# Patient Record
Sex: FEMALE | Race: ASIAN | NOT HISPANIC OR LATINO | URBAN - METROPOLITAN AREA
[De-identification: names, ages, dates, MRNs, and addresses within clinical notes are randomized per-mention and may not be internally consistent; named-entity substitution may affect disease eponyms.]

---

## 2022-08-03 ENCOUNTER — EMERGENCY (EMERGENCY)
Facility: HOSPITAL | Age: 27
LOS: 0 days | Discharge: HOME | End: 2022-08-03
Attending: EMERGENCY MEDICINE
Payer: MEDICAID

## 2022-08-03 VITALS
TEMPERATURE: 99 F | OXYGEN SATURATION: 100 % | RESPIRATION RATE: 17 BRPM | HEART RATE: 92 BPM | DIASTOLIC BLOOD PRESSURE: 68 MMHG | SYSTOLIC BLOOD PRESSURE: 112 MMHG

## 2022-08-03 VITALS — WEIGHT: 102.07 LBS

## 2022-08-03 DIAGNOSIS — M54.50 LOW BACK PAIN, UNSPECIFIED: ICD-10-CM

## 2022-08-03 DIAGNOSIS — M54.40 LUMBAGO WITH SCIATICA, UNSPECIFIED SIDE: ICD-10-CM

## 2022-08-03 PROCEDURE — 99283 EMERGENCY DEPT VISIT LOW MDM: CPT

## 2022-08-03 RX ORDER — IBUPROFEN 200 MG
600 TABLET ORAL ONCE
Refills: 0 | Status: COMPLETED | OUTPATIENT
Start: 2022-08-03 | End: 2022-08-03

## 2022-08-03 RX ADMIN — Medication 60 MILLIGRAM(S): at 12:52

## 2022-08-03 RX ADMIN — Medication 600 MILLIGRAM(S): at 12:52

## 2022-08-03 NOTE — ED STATDOCS - PATIENT PORTAL LINK FT
You can access the FollowMyHealth Patient Portal offered by [facility] by registering at the following website: [email or web address]. By joining [facility]’s FollowMyHealth portal, you will also be able to view your health information using other applications (apps) compatible with our system.

## 2022-08-03 NOTE — ED STATDOCS - CARE PROVIDER_API CALL
Padilla Pryor; PhD)  Neurosurgery  [address] [facility] [address] [facility], [address]nd [place] [address]  Phone: [phone]  Fax: [phone]  Follow Up Time: Urgent

## 2022-08-03 NOTE — ED STATDOCS - OBJECTIVE STATEMENT
26 y/o female w/ no pertinent PMHx presents to the ED c/o lower back pain radiating down her right leg. Pt reports this pain started while she was pregnant and was told the pain would resolve after the pregnancy however she had birth 2.5 months ago and the pain has persisted. Pt states pain is worse at night. Denies and bladder or bowel issues. Denies any fall or injury. No other complaints at this time. 28 y/o female w/ no pertinent PMHx presents to the ED c/o lower back pain radiating down her right leg. Pt reports this pain started while she was pregnant and was told the pain would resolve after the pregnancy however she had birth 2.5 months ago and the pain has persisted. Pt states pain is worse at night. Denies any bladder or bowel issues. Denies any fall or injury. No other complaints at this time.

## 2022-08-03 NOTE — ED ADULT TRIAGE NOTE - CHIEF COMPLAINT QUOTE
Pt presented to the ER with c/o lower back pain. Pt stated that when the came is present it radiates down her right leg and she is unable to move. Pt is unsure if she injured the area. Pt denies any bowel or bladder issues

## 2022-08-03 NOTE — ED STATDOCS - PHYSICAL EXAMINATION
Constitutional: NAD AOx3  Eyes: PERRL EOMI  Head: Normocephalic atraumatic  Mouth: MMM  Cardiac: regular rate and rhythm  Resp: Lungs CTAB  GI: Abd s/nd/nt  Neuro: CN2-12 grossly intact, BALL x 4  Skin: No visible rashes

## 2022-08-03 NOTE — ED STATDOCS - NS ED ATTENDING STATEMENT MOD
I have seen and examined this patient and fully participated in the care of this patient as the teaching attending.  The service was shared with the DAYANNA.  I reviewed and verified the documentation and independently performed the documented:

## 2022-08-03 NOTE — ED STATDOCS - PROGRESS NOTE DETAILS
pt here with sciatica, will give steroids and naids and pt advised to fu with neuro team as scheduled in the ED. pt well appearing ambulating in the ED. -Celia Cox PA-C

## 2022-08-12 PROBLEM — Z00.00 ENCOUNTER FOR PREVENTIVE HEALTH EXAMINATION: Status: ACTIVE | Noted: 2022-08-12

## 2022-08-15 ENCOUNTER — APPOINTMENT (OUTPATIENT)
Dept: NEUROSURGERY | Facility: CLINIC | Age: 27
End: 2022-08-15